# Patient Record
Sex: FEMALE
[De-identification: names, ages, dates, MRNs, and addresses within clinical notes are randomized per-mention and may not be internally consistent; named-entity substitution may affect disease eponyms.]

---

## 2022-05-18 PROBLEM — Z00.00 ENCOUNTER FOR PREVENTIVE HEALTH EXAMINATION: Status: ACTIVE | Noted: 2022-05-18

## 2022-05-20 ENCOUNTER — APPOINTMENT (OUTPATIENT)
Dept: ORTHOPEDIC SURGERY | Facility: CLINIC | Age: 77
End: 2022-05-20
Payer: MEDICARE

## 2022-05-20 VITALS — BODY MASS INDEX: 22.38 KG/M2 | HEIGHT: 60 IN | WEIGHT: 114 LBS

## 2022-05-20 DIAGNOSIS — Z78.9 OTHER SPECIFIED HEALTH STATUS: ICD-10-CM

## 2022-05-20 DIAGNOSIS — E78.00 PURE HYPERCHOLESTEROLEMIA, UNSPECIFIED: ICD-10-CM

## 2022-05-20 DIAGNOSIS — M25.561 PAIN IN RIGHT KNEE: ICD-10-CM

## 2022-05-20 DIAGNOSIS — M25.562 PAIN IN LEFT KNEE: ICD-10-CM

## 2022-05-20 DIAGNOSIS — S83.206S UNSPECIFIED TEAR OF UNSPECIFIED MENISCUS, CURRENT INJURY, RIGHT KNEE, SEQUELA: ICD-10-CM

## 2022-05-20 DIAGNOSIS — S83.207A UNSPECIFIED TEAR OF UNSPECIFIED MENISCUS, CURRENT INJURY, LEFT KNEE, INITIAL ENCOUNTER: ICD-10-CM

## 2022-05-20 PROCEDURE — 99204 OFFICE O/P NEW MOD 45 MIN: CPT | Mod: 25,57

## 2022-05-20 PROCEDURE — 73562 X-RAY EXAM OF KNEE 3: CPT | Mod: 50

## 2022-05-20 PROCEDURE — 20611 DRAIN/INJ JOINT/BURSA W/US: CPT | Mod: 50

## 2022-05-20 RX ORDER — METHYLPREDNISOLONE 4 MG/1
4 TABLET ORAL
Qty: 1 | Refills: 0 | Status: ACTIVE | COMMUNITY
Start: 2022-05-20 | End: 1900-01-01

## 2022-05-20 NOTE — PROCEDURE
[Right] : of the right [Large Joint Injection] : Large joint injection [Left] : of the left [Knee] : knee [FreeTextEntry3] : Large Joint Injection was performed because of pain and inflammation. Anesthesia: ethyl chloride sprayed topically.. \par Depomedrol: An injection of Depomedrol 40 mg , 1 cc. \par Lidocaine: 4 cc. \par \par Medication was injected in the left knee. Patient has tried OTC's including aspirin, Ibuprofen, Aleve etc or prescription NSAIDS, and/or exercises at home and/ or physical therapy without satisfactory response and Patient has decreased mobility in the joint. After verbal consent using sterile preparation and technique. The risks, benefits, and alternatives to cortisone injection were explained in full to the patient. Risks outlined include but are not limited to infection, sepsis, bleeding, scarring, skin discoloration, temporary increase in pain, syncopal episode, failure to resolve symptoms, allergic reaction, symptom recurrence, and elevation of blood sugar in diabetics. Patient understood the risks. All questions were answered. After discussion of options, patient requested an injection. Oral informed consent was obtained and sterile prep was done of the injection site. Sterile technique was utilized for the procedure including the preparation of the solutions used for the injection. Patient tolerated the procedure well. Advised to ice the injection site this evening. Prep with alcohol locally to site. Sterile technique used. Patient tolerated procedure well. Post Procedure Instructions: Patient was advised to call if redness, pain, or fever occur and apply ice for 15 min. out of every hour for the next 12-24 hours as tolerated. patient was advised to rest the joint(s) for 7 days. \par \par \par \par Ultrasound Guidance was used for the following reasons: for precise injection in area of tear and prior failure or difficult injection. \par \par \par \par Ultrasound guided injection was performed of the knee, visualization of the needle and placement of injection was performed without complication. \par \par \par \par \par Large Joint Injection was performed because of pain and inflammation. Anesthesia: ethyl chloride sprayed topically.. \par Depomedrol: An injection of Depomedrol 40 mg , 1 cc. \par Lidocaine: 4 cc. \par \par Medication was injected in the left knee. Patient has tried OTC's including aspirin, Ibuprofen, Aleve etc or prescription NSAIDS, and/or exercises at home and/ or physical therapy without satisfactory response and Patient has decreased mobility in the joint. After verbal consent using sterile preparation and technique. The risks, benefits, and alternatives to cortisone injection were explained in full to the patient. Risks outlined include but are not limited to infection, sepsis, bleeding, scarring, skin discoloration, temporary increase in pain, syncopal episode, failure to resolve symptoms, allergic reaction, symptom recurrence, and elevation of blood sugar in diabetics. Patient understood the risks. All questions were answered. After discussion of options, patient requested an injection. Oral informed consent was obtained and sterile prep was done of the injection site. Sterile technique was utilized for the procedure including the preparation of the solutions used for the injection. Patient tolerated the procedure well. Advised to ice the injection site this evening. Prep with alcohol locally to site. Sterile technique used. Patient tolerated procedure well. Post Procedure Instructions: Patient was advised to call if redness, pain, or fever occur and apply ice for 15 min. out of every hour for the next 12-24 hours as tolerated. patient was advised to rest the joint(s) for 7 days. \par \par \par \par Ultrasound Guidance was used for the following reasons: for precise injection in area of tear and prior failure or difficult injection. \par \par \par \par Ultrasound guided injection was performed of the knee, visualization of the needle and placement of injection was performed without complication. \par \par \par \par

## 2022-05-20 NOTE — HISTORY OF PRESENT ILLNESS
[de-identified] : 77yo female presenting to the office with b/l knee pain x2 years, severe x1-2 months following heavy gardening. She states her pain is in the posterior aspect of her b/l knees. Pain is radiating down b/l LE. Denies any numbness or tingling. Patient states she has been using Tylenol and Voltaren gel with minimal relief.

## 2022-05-20 NOTE — PHYSICAL EXAM
[Left] : left knee [Right] : right knee [Mild patellofemoral OA] : Mild patellofemoral OA [de-identified] : Constitutional: The general appearance of the patient is well developed, well nourished, no deformities and well groomed. Normal \par \par Gait: Gait and function is as follows: normal gait. \par \par Skin: Head and neck visualized skin is normal. Left lower extremity visualized skin is normal Right lower extremity visualized skin is normal. Thoracic Skin of the thoracic spine shows visualized skin is normal. \par \par Cardiovascular: palpable dorsalis pedis pulse bilaterally, good capillary refill in the digits of the bilateral lower extremities and no temperature or color changes in the bilateral lower extremities. \par \par Lymphatic: Normal Palpation of lymph nodes in the cervical. \par \par Neurologic: heel rub from knee to ankle intact in the bilateral lower extremities. Deep Tendon Reflexes in Upper and Lower Extremities No clonus in the bilateral lower extremities.. The patient is oriented to time, place and person. Sensation to light touch intact in the bilateral lower extremities. Mood and Affect is normal. \par \par Neck: \par \par Inspection / Palpation of the cervical spine is as follows: There is a full, pain free, stable range of motion of the cervical spine with normal tone and no tenderness to palpation. \par \par Back, including spine: Inspection / Palpation of the thoracic/lumbar spine is as follows: There is a full, pain free, stable range of motion of the thoracic spine with a normal tone and not tenderness to palpation.. \par \par Right Knee: Inspection of the knee is as follows: mild effusion. no ecchymosis and no streaking. Palpation of the knee is as follows: medial joint line tenderness and patella tendon tenderness. Knee Range of Motion is as follows: Range of motion is limited secondary to pain. Strength examination of the knee is as follows: Quadriceps strength is 5/5 Hamstring strength is 5/5 Ligament Stability and Special Test ligamentously stable. positive mcmurrays. Neurological examination of the knee is as follows: light touch is intact throughout. Gait and function is as follows: normal gait\par \par Left Knee: Inspection of the knee is as follows: mild effusion. no ecchymosis and no streaking. Palpation of the knee is as follows: medial joint line tenderness and patella tendon tenderness. Knee Range of Motion is as follows: Range of motion is limited secondary to pain. Strength examination of the knee is as follows: Quadriceps strength is 5/5 Hamstring strength is 5/5 Ligament Stability and Special Test ligamentously stable. positive mcmurrays. Neurological examination of the knee is as follows: light touch is intact throughout. Gait and function is as follows: normal gait\par

## 2022-05-20 NOTE — DATA REVIEWED
[MRI] : MRI [Left] : left [Knee] : knee [FreeTextEntry1] : [facility] 5/10/22: complete tear of the posterior horn lateral meniscus. Mild chondromalacia wihtin the femoral-tibial compartements. Small joint effusion.  [FreeTextEntry2] : [facility] 5/10/22: complete tear of the posterior horn medial meniscus abutting the posterior root. Mild cartilage loss in the lateral compartment. Small joint effusion and popiteal cyst.

## 2022-05-20 NOTE — DISCUSSION/SUMMARY
[de-identified] : 75yo female presenting to the office with b/l knee pain\par x-rays today of b/l knees demonstrate mild degenerative change; preserved joint space\par MRI of b/l knees demonstrate b/l degenerative meniscus tears\par Patient was treated today with b/l US guided cortisone injections for therapeutic and diagnostic purposes\par She was prescribed an MDP as an anti-inflammatory \par Patient received a PT prescription for strengthening b/l knees\par Discussed with the patient she is a candidate for series of gel shots vs CAM procedure if no improvement \par Follow up 4 weeks \par \par \par (1) We discussed a comprehensive treatment plans that included a prescription management plan involving the use of prescription strength medications to include Ibuprofen 600-800 mg TID, versus 500-650 mg Tylenol. We also discussed prescribing topical diclofenac (Voltaren gel) as well as once daily Meloxicam 15 mg.\par (2) The patient has More Than One chronic injuries/illnesses as outlined, discussed, and documented by ICD 10 codes listed, as well as the HPI and Plan section.\par There is a moderate risk of morbidity with further treatment, especially from use of prescription strength medications and possible side effects of these medications which include upset stomach and cardiac/renal issues with long term use were discussed.\par (3) I recommended that the patient follow-up with their medical physician to discuss any significant specific potential issues with long term use such as interactions with current medications or with exacerbation of underlying medical morbidities. \par \par Attestation:\par I, Danielle Jeffrey , attest that this documentation has been prepared under the direction and in the presence of Provider Juan C Warren MD.\par The documentation recorded by the scribe, in my presence, accurately reflects the service I personally performed, and the decisions made by me with my edits as appropriate.\par Juan C Warren MD\par \par \par

## 2022-07-20 ENCOUNTER — OFFICE (OUTPATIENT)
Dept: URBAN - METROPOLITAN AREA CLINIC 103 | Facility: CLINIC | Age: 77
Setting detail: OPHTHALMOLOGY
End: 2022-07-20
Payer: MEDICARE

## 2022-07-20 DIAGNOSIS — H04.123: ICD-10-CM

## 2022-07-20 DIAGNOSIS — H35.413: ICD-10-CM

## 2022-07-20 DIAGNOSIS — H43.813: ICD-10-CM

## 2022-07-20 PROCEDURE — 99204 OFFICE O/P NEW MOD 45 MIN: CPT | Performed by: OPHTHALMOLOGY

## 2022-07-20 PROCEDURE — 92250 FUNDUS PHOTOGRAPHY W/I&R: CPT | Performed by: OPHTHALMOLOGY

## 2022-07-20 ASSESSMENT — KERATOMETRY
OD_AXISANGLE_DEGREES: 127
OS_AXISANGLE_DEGREES: 043
OS_K1POWER_DIOPTERS: 44.25
OD_K2POWER_DIOPTERS: 45.25
METHOD_AUTO_MANUAL: AUTO
OS_K2POWER_DIOPTERS: 44.50
OD_K1POWER_DIOPTERS: 44.50

## 2022-07-20 ASSESSMENT — AXIALLENGTH_DERIVED
OD_AL: 23.1442
OS_AL: 23.5627

## 2022-07-20 ASSESSMENT — REFRACTION_MANIFEST
OD_AXIS: 45
OD_ADD: +2.50
OS_SPHERE: PLANO
OS_CYLINDER: SPH
OD_CYLINDER: -0.50
OS_ADD: +2.50
OD_SPHERE: PLANO
OD_VA1: 20/25-
OS_VA1: 20/20

## 2022-07-20 ASSESSMENT — REFRACTION_CURRENTRX
OD_OVR_VA: 20/
OS_VPRISM_DIRECTION: SV
OS_ADD: +2.50
OD_ADD: +2.50
OD_VPRISM_DIRECTION: SV
OS_OVR_VA: 20/

## 2022-07-20 ASSESSMENT — REFRACTION_AUTOREFRACTION
OS_SPHERE: -0.50
OD_CYLINDER: -0.75
OS_CYLINDER: -0.50
OS_AXIS: 090
OD_AXIS: 053
OD_SPHERE: +0.25

## 2022-07-20 ASSESSMENT — SPHEQUIV_DERIVED
OD_SPHEQUIV: -0.125
OS_SPHEQUIV: -0.75

## 2022-07-20 ASSESSMENT — SUPERFICIAL PUNCTATE KERATITIS (SPK)
OD_SPK: ABSENT
OS_SPK: ABSENT

## 2022-07-20 ASSESSMENT — CONFRONTATIONAL VISUAL FIELD TEST (CVF)
OS_FINDINGS: FULL
OD_FINDINGS: FULL

## 2022-07-20 ASSESSMENT — TONOMETRY
OD_IOP_MMHG: 17
OS_IOP_MMHG: 17

## 2022-07-20 ASSESSMENT — VISUAL ACUITY
OD_BCVA: 20/25+2
OS_BCVA: 20/25-2

## 2023-11-01 ENCOUNTER — OFFICE (OUTPATIENT)
Dept: URBAN - METROPOLITAN AREA CLINIC 103 | Facility: CLINIC | Age: 78
Setting detail: OPHTHALMOLOGY
End: 2023-11-01
Payer: MEDICARE

## 2023-11-01 DIAGNOSIS — H43.813: ICD-10-CM

## 2023-11-01 DIAGNOSIS — H35.413: ICD-10-CM

## 2023-11-01 DIAGNOSIS — H04.123: ICD-10-CM

## 2023-11-01 PROCEDURE — 92250 FUNDUS PHOTOGRAPHY W/I&R: CPT | Performed by: OPHTHALMOLOGY

## 2023-11-01 PROCEDURE — 92014 COMPRE OPH EXAM EST PT 1/>: CPT | Performed by: OPHTHALMOLOGY

## 2023-11-01 ASSESSMENT — REFRACTION_CURRENTRX
OD_ADD: +2.50
OS_ADD: +2.50
OD_OVR_VA: 20/
OD_VPRISM_DIRECTION: SV
OS_OVR_VA: 20/
OS_VPRISM_DIRECTION: SV

## 2023-11-01 ASSESSMENT — REFRACTION_MANIFEST
OD_SPHERE: PLANO
OD_ADD: +2.50
OS_CYLINDER: SPH
OS_ADD: +2.50
OD_CYLINDER: -0.50
OS_VA1: 20/20
OS_SPHERE: PLANO
OD_VA1: 20/25-
OD_AXIS: 45

## 2023-11-01 ASSESSMENT — SPHEQUIV_DERIVED
OD_SPHEQUIV: -0.25
OS_SPHEQUIV: -0.375

## 2023-11-01 ASSESSMENT — REFRACTION_AUTOREFRACTION
OS_CYLINDER: -0.75
OS_AXIS: 091
OD_CYLINDER: -1.00
OD_AXIS: 064
OD_SPHERE: +0.25
OS_SPHERE: 0.00

## 2023-11-01 ASSESSMENT — CONFRONTATIONAL VISUAL FIELD TEST (CVF)
OD_FINDINGS: FULL
OS_FINDINGS: FULL

## 2023-11-01 ASSESSMENT — SUPERFICIAL PUNCTATE KERATITIS (SPK)
OS_SPK: ABSENT
OD_SPK: ABSENT

## 2024-12-10 ENCOUNTER — OFFICE (OUTPATIENT)
Dept: URBAN - METROPOLITAN AREA CLINIC 103 | Facility: CLINIC | Age: 79
Setting detail: OPHTHALMOLOGY
End: 2024-12-10
Payer: MEDICARE

## 2024-12-10 DIAGNOSIS — H04.123: ICD-10-CM

## 2024-12-10 DIAGNOSIS — H43.392: ICD-10-CM

## 2024-12-10 DIAGNOSIS — H35.413: ICD-10-CM

## 2024-12-10 DIAGNOSIS — G43.111: ICD-10-CM

## 2024-12-10 DIAGNOSIS — H43.813: ICD-10-CM

## 2024-12-10 PROCEDURE — 92250 FUNDUS PHOTOGRAPHY W/I&R: CPT | Performed by: OPHTHALMOLOGY

## 2024-12-10 PROCEDURE — 92014 COMPRE OPH EXAM EST PT 1/>: CPT | Performed by: OPHTHALMOLOGY

## 2024-12-10 ASSESSMENT — REFRACTION_MANIFEST
OS_VA1: 20/20
OS_ADD: +2.50
OD_SPHERE: PLANO
OD_ADD: +2.50
OD_VA1: 20/25-
OS_CYLINDER: SPH
OD_AXIS: 45
OS_SPHERE: PLANO
OD_CYLINDER: -0.50

## 2024-12-10 ASSESSMENT — REFRACTION_CURRENTRX
OD_OVR_VA: 20/
OD_VPRISM_DIRECTION: SV
OD_ADD: +2.50
OS_VPRISM_DIRECTION: SV
OS_OVR_VA: 20/
OS_ADD: +2.50

## 2024-12-10 ASSESSMENT — REFRACTION_AUTOREFRACTION
OS_SPHERE: 0.00
OD_AXIS: 064
OD_SPHERE: +0.25
OS_CYLINDER: -0.75
OS_AXIS: 091
OD_CYLINDER: -1.00

## 2024-12-10 ASSESSMENT — TONOMETRY
OS_IOP_MMHG: 15
OD_IOP_MMHG: 15

## 2024-12-10 ASSESSMENT — KERATOMETRY
OS_K2POWER_DIOPTERS: 44.50
OD_AXISANGLE_DEGREES: 132
OD_K2POWER_DIOPTERS: 44.75
OS_AXISANGLE_DEGREES: 023
METHOD_AUTO_MANUAL: AUTO
OD_K1POWER_DIOPTERS: 44.00
OS_K1POWER_DIOPTERS: 44.25

## 2024-12-10 ASSESSMENT — VISUAL ACUITY
OD_BCVA: 20/20
OS_BCVA: 20/40

## 2024-12-10 ASSESSMENT — CONFRONTATIONAL VISUAL FIELD TEST (CVF)
OD_FINDINGS: FULL
OS_FINDINGS: FULL

## 2024-12-10 ASSESSMENT — SUPERFICIAL PUNCTATE KERATITIS (SPK)
OD_SPK: ABSENT
OS_SPK: ABSENT

## 2025-03-20 ENCOUNTER — APPOINTMENT (OUTPATIENT)
Dept: NEUROLOGY | Facility: CLINIC | Age: 80
End: 2025-03-20